# Patient Record
Sex: FEMALE | Race: WHITE | NOT HISPANIC OR LATINO | URBAN - METROPOLITAN AREA
[De-identification: names, ages, dates, MRNs, and addresses within clinical notes are randomized per-mention and may not be internally consistent; named-entity substitution may affect disease eponyms.]

---

## 2020-08-12 ENCOUNTER — AMBULATORY - RIVER FALLS (OUTPATIENT)
Dept: FAMILY MEDICINE | Facility: CLINIC | Age: 30
End: 2020-08-12

## 2020-11-11 ENCOUNTER — OFFICE VISIT - RIVER FALLS (OUTPATIENT)
Dept: FAMILY MEDICINE | Facility: CLINIC | Age: 30
End: 2020-11-11

## 2020-11-11 ENCOUNTER — AMBULATORY - RIVER FALLS (OUTPATIENT)
Dept: FAMILY MEDICINE | Facility: CLINIC | Age: 30
End: 2020-11-11

## 2020-11-17 ENCOUNTER — OFFICE VISIT - RIVER FALLS (OUTPATIENT)
Dept: FAMILY MEDICINE | Facility: CLINIC | Age: 30
End: 2020-11-17

## 2020-11-20 ENCOUNTER — OFFICE VISIT - RIVER FALLS (OUTPATIENT)
Dept: FAMILY MEDICINE | Facility: CLINIC | Age: 30
End: 2020-11-20

## 2020-12-02 ENCOUNTER — OFFICE VISIT - RIVER FALLS (OUTPATIENT)
Dept: FAMILY MEDICINE | Facility: CLINIC | Age: 30
End: 2020-12-02

## 2022-02-11 VITALS
WEIGHT: 129.4 LBS | HEART RATE: 67 BPM | DIASTOLIC BLOOD PRESSURE: 60 MMHG | SYSTOLIC BLOOD PRESSURE: 100 MMHG | OXYGEN SATURATION: 99 % | TEMPERATURE: 98.5 F

## 2022-02-11 VITALS
HEART RATE: 60 BPM | RESPIRATION RATE: 16 BRPM | HEART RATE: 64 BPM | SYSTOLIC BLOOD PRESSURE: 90 MMHG | DIASTOLIC BLOOD PRESSURE: 60 MMHG | WEIGHT: 129 LBS | TEMPERATURE: 98.1 F | RESPIRATION RATE: 16 BRPM | SYSTOLIC BLOOD PRESSURE: 106 MMHG | TEMPERATURE: 98.4 F | WEIGHT: 129 LBS | DIASTOLIC BLOOD PRESSURE: 68 MMHG

## 2022-02-16 NOTE — PROGRESS NOTES
Chief Complaint    Work comp injury- left pinky finger.  History of Present Illness       Patient is a 29-year-old female Romansh-speaking patient who is here with a left 5th finger injury.  We used a telephone  for our visit.       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  She says the nail is just holding on because of the blood.       She does have some numbness at the tip of that finger.  No other injury from today.  No previous injury to this hand.  No head injury today.       The patient is right-handed.       She has had tetanus shots in the past but does not recall if she is up-to-date currently.       No allergies to medications.          Review of Systems      Negative except as listed in HPI.  Physical Exam   Vitals & Measurements    T: 98.5  F (Tympanic)  HR: 67 (Peripheral)  BP: 100/60  SpO2: 99%     WT: 129.4 lb        Vitals noted and within normal limits.       In general she is alert, oriented, and in no acute distress.       Left hand radial pulse is normal.       There is no tenderness to palpation of the wrist, metacarpals, or the 1st through 4th phalanges.  Sensation intact distally except for at the 5th finger.  There is tenderness to palpation of the 5th finger.  The nail is lifted and there is granulation tissue and blood.       X-ray: Distal phalanx tuft fracture with soft tissue swelling       Patient brought to procedure room.  Digital block with plain lidocaine and finger tourniquet placed.       Distal left fifth finger cleaned and explored.  Laceration vertically at the medial aspect of the nail.  Nail has avulsed from the nail bed but is otherwise well seated.  Appears to be a small laceration proximally laterally at the fingernail as well.       4-0 Ethilon suture is used to place 4 simple interrupted sutures.  3 sutures are placed at the nail medially and one laterally.       Finger tourniquet  removed.       Area cleaned again and no further bleeding.       Patient tolerated procedure well.          Assessment/Plan       Encounter for immunization (Z23)          Tetanus given today due to injury         Ordered:          tetanus/diphth/pertuss (Tdap) adult/adol, 0.5 mL, im, once, (Completed)          46860 imadm prq id subq/im njxs 1 vaccine (Charge), Quantity: 1, Encounter for immunization          48055 tdap vaccine 7/> yr im (Charge), Quantity: 1, Encounter for immunization                Finger injury (S69.90XA)        Area covered with triple antibiotic ointment and tube gauze placed.  Patient instructed with interpreters help to leave this in place for 24 hours and then she may change the dressing daily.  Finger splint applied and she should continue to wear that for the fracture.  Worker's Comp. paperwork filled out and per the patient's request I spoke to her boss who was in the waiting room.  No work using the left hand.  Follow-up in a week.        Also with the 's help asked her to watch for signs of infection including increased pain, redness or drainage.  If any of that happens she should follow-up sooner.         Ordered:          XR Fingers Min 2 Views Left (Request), Priority: STAT, Finger injury                Fracture of finger, distal phalanx (S62.639A)         finger splint         Ordered:          cephalexin, = 1 cap(s) ( 500 mg ), Oral, qid, x 10 day(s), # 40 cap(s), 0 Refill(s), Type: Acute, Pharmacy: [facility], 1 cap(s) Oral qid,x10 day(s), 129.4, lb, 11/11/20 11:47:00 CST, Weight Measured, (Ordered)                Open finger fracture (S62.609B)         antibiotic prophylaxis with cephalexin        watch for signs of infection         Ordered:          cephalexin, = 1 cap(s) ( 500 mg ), Oral, qid, x 10 day(s), # 40 cap(s), 0 Refill(s), Type: Acute, Pharmacy: [facility], 1 cap(s) Oral qid,x10 day(s), 129.4, lb, 11/11/20 11:47:00 CST, Weight  Measured, (Ordered)                Orders:         Return to Clinic (Request), RFV: f/u work comp injury, Return in 10 days  Patient Information     Name:KALYN PEREZ      Address:      [address] [place] [id number]     Sex:Female     YOB: 1990     [phone]     Emergency Contact:[facility] EMERGENCY, CONTACT     MRN:[id number]     FIN:[id number]     Location:[facility]     Date of Service:11/11/2020      Primary Care Physician:       NONE ,       Attending Physician:       Parvin Ramos MD, [phone]  Problem List/Past Medical History    Ongoing     No qualifying data    Historical     No qualifying data  Medications    cephalexin 500 mg oral capsule, 500 mg= 1 cap(s), Oral, qid  Allergies    No Known Medication Allergies  Social History    Smoking Status     Never smoker  Immunizations      Vaccine Date Status          tetanus/diphth/pertuss (Tdap) adult/adol 11/11/2020 Given

## 2022-02-16 NOTE — NURSING NOTE
Comprehensive Intake Entered On:  11/20/2020 4:29 PM CST    Performed On:  11/20/2020 4:22 PM CST by Jensen Tan CMA               Summary   Chief Complaint :   Pt here for a Work Comp FU for left 5th finger njury.  Pt states giner is still painful and there is some drainage from suture site.   Weight Measured :   129 lb(Converted to: 129 lb 0 oz, 58.513 kg)    Systolic Blood Pressure :   90 mmHg   Diastolic Blood Pressure :   60 mmHg   Mean Arterial Pressure :   70 mmHg   Peripheral Pulse Rate :   60 bpm   BP Site :   Right arm   Pulse Site :   Radial artery   Temperature Tympanic :   98.1 DegF(Converted to: 36.7 DegC)    Respiratory Rate :   16 br/min   Jensen Tan CMA - 11/20/2020 4:22 PM CST   Health Status   Allergies Verified? :   Yes   Medication History Verified? :   Yes   Medical History Verified? :   Yes   Pre-Visit Planning Status :   Not completed   Tobacco Use? :   Never smoker   Jensen Tan CMA - 11/20/2020 4:22 PM CST   Meds / Allergies   (As Of: 11/20/2020 4:29:27 PM CST)   Allergies (Active)   No Known Medication Allergies  Estimated Onset Date:   Unspecified ; Created By:   Camilla Perez CMA; Reaction Status:   Active ; Category:   Drug ; Substance:   No Known Medication Allergies ; Type:   Allergy ; Updated By:   Camilla Perez CMA; Reviewed Date:   11/18/2020 8:42 AM CST        Medication List   (As Of: 11/20/2020 4:29:27 PM CST)   Prescription/Discharge Order    cephalexin  :   cephalexin ; Status:   Prescribed ; Ordered As Mnemonic:   cephalexin 500 mg oral capsule ; Simple Display Line:   500 mg, 1 cap(s), Oral, qid, for 10 day(s), 40 cap(s), 0 Refill(s) ; Ordering Provider:   Parvin Ramos MD; Catalog Code:   cephalexin ; Order Dt/Tm:   11/11/2020 1:26:51 PM CST            ID Risk Screen   Recent Travel History :   No recent travel   Family Member Travel History :   No recent travel   Other Exposure to Infectious Disease :   Unknown   Jensen Tan CMA - 11/20/2020 4:22 PM CST  FYI - Writer advise that the soonest availability IS Thursday. Patient had questions regarding medications that her PCP prescribed in past. Vyvanse and Adderall. Patient was encouraged to call her PCP office to discuss these medications with him because Dr Drake isn't the prescribing MD. Patient stated that her PCP and Dr Drake spoke and it wasn't recommended that patient go on back on Adderall because it is a stimulant. Patient wanted to see Dr Drake sooner to discuss the reason why she had seizure is because she wasn't taking her Carbamazepine daily; only sporadically. Writer  encouraged patient to contact her PCP to discuss  medication changes that Dr Henderson prescribes. Patient expressed understanding       Social History   Social History   (As Of: 11/20/2020 4:29:27 PM CST)   Tobacco:        Never (less than 100 in lifetime)   (Last Updated: 11/17/2020 4:08:31 PM CST by Sherita Regalado CMA)          Electronic Cigarette/Vaping:        Electronic Cigarette Use: Never.   (Last Updated: 11/17/2020 4:08:31 PM CST by Sherita Regalado CMA)

## 2022-02-16 NOTE — NURSING NOTE
Comprehensive Intake Entered On:  11/17/2020 4:10 PM CST    Performed On:  11/17/2020 4:06 PM CST by Sherita Regalado CMA               Summary   Chief Complaint :   Pt here forWC f/u on left pinky finger DOI 11/11/20   Sherita Regalado CMA - 11/17/2020 4:06 PM CST   Health Status   Allergies Verified? :   Yes   Medication History Verified? :   Yes   Medical History Verified? :   Yes   Pre-Visit Planning Status :   Completed   Tobacco Use? :   Never smoker   Sherita Regalado CMA - 11/17/2020 4:06 PM CST   Consents   Consent for Immunization Exchange :   Consent Granted   Consent for Immunizations to Providers :   Consent Granted   Sherita Regalado CMA - 11/17/2020 4:06 PM CST   Meds / Allergies   (As Of: 11/17/2020 4:10:53 PM CST)   Allergies (Active)   No Known Medication Allergies  Estimated Onset Date:   Unspecified ; Created By:   Camilla Perez CMA; Reaction Status:   Active ; Category:   Drug ; Substance:   No Known Medication Allergies ; Type:   Allergy ; Updated By:   Camilla Perez CMA; Reviewed Date:   11/17/2020 4:10 PM CST        Medication List   (As Of: 11/17/2020 4:10:53 PM CST)   Prescription/Discharge Order    cephalexin  :   cephalexin ; Status:   Prescribed ; Ordered As Mnemonic:   cephalexin 500 mg oral capsule ; Simple Display Line:   500 mg, 1 cap(s), Oral, qid, for 10 day(s), 40 cap(s), 0 Refill(s) ; Ordering Provider:   Parvin Ramos MD; Catalog Code:   cephalexin ; Order Dt/Tm:   11/11/2020 1:26:51 PM CST            ID Risk Screen   Recent Travel History :   No recent travel   Family Member Travel History :   No recent travel   Other Exposure to Infectious Disease :   Unknown   Sherita Regalado CMA - 11/17/2020 4:06 PM CST   Social History   Social History   (As Of: 11/17/2020 4:10:53 PM CST)   Tobacco:        Never (less than 100 in lifetime)   (Last Updated: 11/17/2020 4:08:31 PM CST by Sherita Regalado CMA)          Electronic Cigarette/Vaping:        Electronic Cigarette Use: Never.    (Last Updated: 11/17/2020 4:08:31 PM CST by Sherita Regalado CMA)

## 2022-02-16 NOTE — NURSING NOTE
Comprehensive Intake Entered On:  11/11/2020 11:53 AM CST    Performed On:  11/11/2020 11:47 AM CST by Camilla Perez CMA               Summary   Chief Complaint :   Work comp injury- left pinky finger.    Weight Measured :   129.4 lb(Converted to: 129 lb 6 oz, 58.695 kg)    Systolic Blood Pressure :   100 mmHg   Diastolic Blood Pressure :   60 mmHg   Mean Arterial Pressure :   73 mmHg   Peripheral Pulse Rate :   67 bpm   BP Site :   Right arm   BP Method :   Manual   HR Method :   Electronic   Temperature Tympanic :   98.5 DegF(Converted to: 36.9 DegC)    Oxygen Saturation :   99 %   Camilla Perez CMA - 11/11/2020 11:47 AM CST   Health Status   Allergies Verified? :   Yes   Medication History Verified? :   Yes   Pre-Visit Planning Status :   N/A   Tobacco Use? :   Never smoker   Camilla Perez CMA - 11/11/2020 11:47 AM CST   Consents   Consent for Immunization Exchange :   Consent Granted   Consent for Immunizations to Providers :   Consent Granted   Camilla Perez CMA - 11/11/2020 11:47 AM CST   Meds / Allergies   (As Of: 11/11/2020 11:53:04 AM CST)   Allergies (Active)   No Known Medication Allergies  Estimated Onset Date:   Unspecified ; Created By:   Camilla Perez CMA; Reaction Status:   Active ; Category:   Drug ; Substance:   No Known Medication Allergies ; Type:   Allergy ; Updated By:   Camilla Perez CMA; Reviewed Date:   11/11/2020 11:50 AM CST        Medication List   (As Of: 11/11/2020 11:53:04 AM CST)        ID Risk Screen   Recent Travel History :   No recent travel   Family Member Travel History :   No recent travel   Other Exposure to Infectious Disease :   Unknown   Camilla Perez CMA - 11/11/2020 11:47 AM CST

## 2022-02-16 NOTE — PROGRESS NOTES
Patient:   KALYN PEREZ            MRN: [id number]            FIN: [id number]               Age:   29 years     Sex:  Female     :  1990   Associated Diagnoses:   Injury of finger of left hand   Author:   Rolando Judge PA-C      Chief Complaint   2020 4:22 PM CST   Pt here for a Work Comp FU for left 5th finger njury.  Pt states giner is still painful and there is some drainage from suture site.        History of Present Illness   2020: Patient is a 29-year-old female Bengali-speaking patient who is here with a left 5th finger injury. We used a telephone  for our visit.  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 She says the nail is just holding on because of the blood.  She does have some numbness at the tip of that finger. No other injury from today. No previous injury to this hand. No head injury today.  The patient is right-handed.  She has had tetanus shots in the past but does not recall if she is up-to-date currently.  No allergies to medications.      2020:   The patient presents with a laceration, following a direct blow, right small finger distal segment, open fracture.  The incident occurred 1 week(s) ago.  The location of the injury is the right, hand.  The injury is characterized by pain and loss of mobility.  The severity of the symptom(s) associated to the specific injury is moderate.  The timing/course of injury symptom(s) is constant.  Associated symptoms consist of none.  Prior treatment consists of immobilization, wound care and laceration repair.  Incident happened at work.       2020:  here today for follow up on left 5th finger injury, she is on cephalexin, had small tuft fx at tip of finger, and had nail avulsion  still having pain, is taking ibuprofen intermittently  is having some drainage from finger    visit is done through a telephone        Review of Systems   Constitutional:  Negative.    Ear/Nose/Mouth/Throat:  Negative.    Respiratory:  Negative.       Health Status   Allergies:    Allergic Reactions (Selected)  No Known Medication Allergies   Medications:  (Selected)   Prescriptions  Prescribed  cephalexin 500 mg oral capsule: = 1 cap(s) ( 500 mg ), Oral, qid, x 10 day(s), # 40 cap(s), 0 Refill(s), Type: Acute, Pharmacy: [facility], 1 cap(s) Oral qid,x10 day(s), 129.4, lb, 11/11/20 11:47:00 CST, Weight Measured,    Medications          *denotes recorded medication          cephalexin 500 mg oral capsule: 500 mg, 1 cap(s), Oral, qid, for 10 day(s), 40 cap(s), 0 Refill(s).          Physical Examination   Vital Signs   11/20/2020 4:22 PM CST Temperature Tympanic 98.1 DegF    Peripheral Pulse Rate 60 bpm    Pulse Site Radial artery    Respiratory Rate 16 br/min    Systolic Blood Pressure 90 mmHg    Diastolic Blood Pressure 60 mmHg    Mean Arterial Pressure 70 mmHg    BP Site Right arm      Measurements from flowsheet : Measurements   11/20/2020 4:22 PM CST   Weight Measured - Standard                129 lb     General:  No acute distress.    Integumentary:  left 5th finger is very tender, she will not flex it, nail is secured with sutures,  sutures are removed with just slight seepage of blood, no active drainage.       Impression and Plan   Diagnosis     Injury of finger of left hand ([id number] S69.92XA).     Plan:  sutures are removed,  nail is bandaged, she is given a baseball type splint to wear for the next 10 days  work restrictions extended until 12/1  follow up as needed.    Orders     Orders   Charges (Evaluation and Management):  [id number] office outpatient visit 15 minutes (Charge) (Order): Quantity: 1, Injury of finger of left hand.

## 2022-02-16 NOTE — PROGRESS NOTES
Patient:   KALYN PEREZ            MRN: [id number]            FIN: [id number]               Age:   29 years     Sex:  Female     :  1990   Associated Diagnoses:   Finger fracture, left; Finger injury   Author:   Rolando Judge PA-C      Chief Complaint   2020 4:24 PM CST    Pt here for a work comp FU for a Left 5th finger injury.  Pt states she is still having some pain in finger.      History of Present Illness   2020: Patient is a 29-year-old female Salvadorean-speaking patient who is here with a left 5th finger injury. We used a telephone  for our visit.  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 She says the nail is just holding on because of the blood.  She does have some numbness at the tip of that finger. No other injury from today. No previous injury to this hand. No head injury today.  The patient is right-handed.  She has had tetanus shots in the past but does not recall if she is up-to-date currently.  No allergies to medications.      2020:   The patient presents with a laceration, following a direct blow, right small finger distal segment, open fracture.  The incident occurred 1 week(s) ago.  The location of the injury is the right, hand.  The injury is characterized by pain and loss of mobility.  The severity of the symptom(s) associated to the specific injury is moderate.  The timing/course of injury symptom(s) is constant.  Associated symptoms consist of none.  Prior treatment consists of immobilization, wound care and laceration repair.  Incident happened at work.       2020:  here today for follow up on left 5th finger injury, she is on cephalexin, had small tuft fx at tip of finger, and had nail avulsion  still having pain, is taking ibuprofen intermittently  is having some drainage from finger    visit is done through a telephone      2020:  here today  for follow up on work comp finger injury and tuft fx of left fifth finger  visit is done through a    at last visit the sutures were removed and she was given a baseball type splint  is still having some pain in finger  no drainage         Health Status   Allergies:    Allergic Reactions (Selected)  No Known Medication Allergies      Physical Examination   Vital Signs   12/2/2020 4:24 PM CST Temperature Tympanic 98.4 DegF    Peripheral Pulse Rate 64 bpm    Pulse Site Radial artery    Respiratory Rate 16 br/min    Systolic Blood Pressure 106 mmHg    Diastolic Blood Pressure 68 mmHg    Mean Arterial Pressure 81 mmHg    BP Site Right arm      Measurements from flowsheet : Measurements   12/2/2020 4:24 PM CST    Weight Measured - Standard                129 lb     General:  No acute distress.    Musculoskeletal:  left 5th finger,  reasonable but not quite full flexion and extension,  nail in intact but will probably fall off in the future  no tenderness over 5th MCP or DIP, there is some tenderness with palpation over PIP and tip of finger  no drainage from around nail  .       Impression and Plan   Diagnosis     Finger fracture, left ([id number] S62.525B).     Finger injury ([id number] S60.152D).     Course:  Improving, Progressing as expected.    Summary:  finger is healing as expected,  she can return to work tomorrow without restrictions, except for buddy splint as needed (she is given 2 buddy splints to use prn), advised to continue using solid splint (baseball) at night during sleep  follow up if not improving.    Orders     Orders   Charges (Evaluation and Management):  [id number] office outpatient visit 15 minutes (Charge) (Order): Quantity: 1, Finger injury  Finger fracture, left.

## 2022-02-16 NOTE — PROGRESS NOTES
Patient:   KALYN PEREZ            MRN: [id number]            FIN: [id number]               Age:   29 years     Sex:  Female     :  1990   Associated Diagnoses:   Fracture of finger of right hand with routine healing; Laceration of skin of finger   Author:   Rolando Lynn MD      Impression and Plan   Diagnosis     Fracture of finger of right hand with routine healing ([id number]-GS S62.609D).     Laceration of skin of finger (TPZ75-IR S61.219A).     Course:  Improving.    Plan:    1. Continue antibiotic until complete  2. keep wound clean and dry  3. Wear splint for two weeks  4. No use of right hand for two weeks.  Work note given.  5. follow up on 2020 for suture removal and wound check.    Summary:  Used Bhutanese speaking phone nterpreter for the encounter.    Orders     Orders   Charges (Evaluation and Management):  [id number] office outpatient visit 15 minutes (Charge) (Order): Quantity: 1, Fracture of finger of right hand with routine healing  Laceration of skin of finger.        Visit Information   Additional Information:  2020 4:06 PM CST   Pt here forWC f/u on left pinky finger DOI 20  .       History of Present Illness        The patient presents with a laceration, following a direct blow, right small finger distal segment, open fracture.  The incident occurred 1 week(s) ago.  The location of the injury is the right, hand.  The injury is characterized by pain and loss of mobility.  The severity of the symptom(s) associated to the specific injury is moderate.  The timing/course of injury symptom(s) is constant.  Associated symptoms consist of none.  Prior treatment consists of immobilization, wound care and laceration repair.  Incident happened at work.        Review of Systems   Constitutional:  Negative.    Eye:  Negative.    Ear/Nose/Mouth/Throat:  Negative.    Respiratory:  Negative.    Cardiovascular:  Negative.    Gastrointestinal:  Negative.    Genitourinary:  Negative.     Hematology/Lymphatics:  Negative.    Endocrine:  Negative.    Immunologic:  Negative.    Musculoskeletal:  Negative except as documented in history of present illness.    Integumentary:  Negative.    Neurologic:  Negative.    Psychiatric:  Negative.    All other systems reviewed and negative      Health Status   Allergies:    Allergic Reactions (Selected)  No Known Medication Allergies   Medications:  (Selected)   Prescriptions  Prescribed  cephalexin 500 mg oral capsule: = 1 cap(s) ( 500 mg ), Oral, qid, x 10 day(s), # 40 cap(s), 0 Refill(s), Type: Acute, Pharmacy: [facility], 1 cap(s) Oral qid,x10 day(s), 129.4, lb, 11/11/20 11:47:00 CST, Weight Measured      Physical Examination   General:  Alert and oriented X 3, No acute distress, Warm, Pink, Intact.         Appearance: Within normal limits, Well nourished, Calm.         Hydration: Within normal limits.         Psych: Within normal limits, Appropriate mood and affect, Cooperative, Normal judgment.    Cardiovascular:  Good pulses equal in all extremities, Normal peripheral perfusion, No edema.    Integumentary:  wound healing appropriately without overt signs of infection or dehiscense.    Neurologic:  Normal sensory, Normal motor function, No focal deficits.

## 2022-02-16 NOTE — NURSING NOTE
Comprehensive Intake Entered On:  12/2/2020 4:31 PM CST    Performed On:  12/2/2020 4:24 PM CST by Jensen Tan CMA               Summary   Chief Complaint :   Pt here for a work comp FU for a Left 5th finger injury.  Pt states she is still having some pain in finger.   Weight Measured :   129 lb(Converted to: 129 lb 0 oz, 58.513 kg)    Systolic Blood Pressure :   106 mmHg   Diastolic Blood Pressure :   68 mmHg   Mean Arterial Pressure :   81 mmHg   Peripheral Pulse Rate :   64 bpm   BP Site :   Right arm   Pulse Site :   Radial artery   Temperature Tympanic :   98.4 DegF(Converted to: 36.9 DegC)    Respiratory Rate :   16 br/min   Jensen Tan CMA - 12/2/2020 4:24 PM CST   Health Status   Allergies Verified? :   Yes   Medication History Verified? :   Yes   Medical History Verified? :   Yes   Pre-Visit Planning Status :   Not completed   Tobacco Use? :   Never smoker   Jensen Tan CMA - 12/2/2020 4:24 PM CST   Meds / Allergies   (As Of: 12/2/2020 4:31:14 PM CST)   Allergies (Active)   No Known Medication Allergies  Estimated Onset Date:   Unspecified ; Created By:   Camilla Perez CMA; Reaction Status:   Active ; Category:   Drug ; Substance:   No Known Medication Allergies ; Type:   Allergy ; Updated By:   Camilla Perez CMA; Reviewed Date:   12/2/2020 4:24 PM CST        Medication List   (As Of: 12/2/2020 4:31:14 PM CST)        ID Risk Screen   Recent Travel History :   No recent travel   Family Member Travel History :   No recent travel   Other Exposure to Infectious Disease :   Unknown   Jensen Tan CMA - 12/2/2020 4:24 PM CST   Social History   Social History   (As Of: 12/2/2020 4:31:14 PM CST)   Tobacco:        Never (less than 100 in lifetime)   (Last Updated: 11/17/2020 4:08:31 PM CST by Sherita Regalado CMA)          Electronic Cigarette/Vaping:        Electronic Cigarette Use: Never.   (Last Updated: 11/17/2020 4:08:31 PM CST by Sherita Regalado CMA)